# Patient Record
Sex: MALE | Race: BLACK OR AFRICAN AMERICAN | NOT HISPANIC OR LATINO | URBAN - METROPOLITAN AREA
[De-identification: names, ages, dates, MRNs, and addresses within clinical notes are randomized per-mention and may not be internally consistent; named-entity substitution may affect disease eponyms.]

---

## 2020-10-16 ENCOUNTER — EMERGENCY (EMERGENCY)
Facility: HOSPITAL | Age: 30
LOS: 1 days | Discharge: HOME | End: 2020-10-16
Admitting: EMERGENCY MEDICINE
Payer: SELF-PAY

## 2020-10-16 VITALS
SYSTOLIC BLOOD PRESSURE: 139 MMHG | HEART RATE: 91 BPM | OXYGEN SATURATION: 99 % | TEMPERATURE: 99 F | RESPIRATION RATE: 17 BRPM | DIASTOLIC BLOOD PRESSURE: 93 MMHG

## 2020-10-16 PROCEDURE — 99053 MED SERV 10PM-8AM 24 HR FAC: CPT

## 2020-10-16 PROCEDURE — 99281 EMR DPT VST MAYX REQ PHY/QHP: CPT

## 2020-10-16 NOTE — ED ADULT TRIAGE NOTE - CHIEF COMPLAINT QUOTE
pt arrives for stitches removal from back of right leg. pt denies any foul odor or drainage from site.

## 2020-10-16 NOTE — ED PROVIDER NOTE - PATIENT PORTAL LINK FT
You can access the FollowMyHealth Patient Portal offered by [facility] by registering at the following website: [email or web address]. By joining [employer]’s FollowMyHealth portal, you will also be able to view your health information using other applications (apps) compatible with our system.

## 2020-10-16 NOTE — ED PROVIDER NOTE - SKIN WOUND TYPE
healed wound noted on the posterior aspect of the right thigh, 9 stitches intact with scabbing over stitches, no erythema swelling, discharge noted from wound, neurovascular intact.  4cm lac noted

## 2020-10-16 NOTE — ED PROVIDER NOTE - OBJECTIVE STATEMENT
30 y.o male with no PMHx presented to the ED for stitch removal, patient had stitches placed on 10/1/20 after he was cut with a piece of glass, was told to return to [facility] within 7-10 days however prolonged his return till today, patient states that he approx got 9-10 stitches but is unsure to completely recall, patient has been having improvement within the past few days, patient denies having any nausea, vomiting, diarrhea, abdominal pain, fever ,chills, headaches, dizziness.

## 2020-10-16 NOTE — ED PROVIDER NOTE - CLINICAL SUMMARY MEDICAL DECISION MAKING FREE TEXT BOX
30 y.o male with no PMHx presented to the ED for stitch removal, stitch removal, bacitracin, kerlex wrap
